# Patient Record
Sex: FEMALE | Race: WHITE | NOT HISPANIC OR LATINO | Employment: FULL TIME | URBAN - METROPOLITAN AREA
[De-identification: names, ages, dates, MRNs, and addresses within clinical notes are randomized per-mention and may not be internally consistent; named-entity substitution may affect disease eponyms.]

---
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
## 2021-05-14 ENCOUNTER — HOSPITAL ENCOUNTER (OUTPATIENT)
Dept: LAB | Facility: MEDICAL CENTER | Age: 21
End: 2021-05-14
Attending: FAMILY MEDICINE
Payer: COMMERCIAL

## 2021-05-14 LAB
BASOPHILS # BLD AUTO: 0.6 % (ref 0–1.8)
BASOPHILS # BLD: 0.04 K/UL (ref 0–0.12)
EOSINOPHIL # BLD AUTO: 0.13 K/UL (ref 0–0.51)
EOSINOPHIL NFR BLD: 2 % (ref 0–6.9)
ERYTHROCYTE [DISTWIDTH] IN BLOOD BY AUTOMATED COUNT: 39.9 FL (ref 35.9–50)
HCT VFR BLD AUTO: 47.8 % (ref 37–47)
HGB BLD-MCNC: 15.5 G/DL (ref 12–16)
IMM GRANULOCYTES # BLD AUTO: 0.01 K/UL (ref 0–0.11)
IMM GRANULOCYTES NFR BLD AUTO: 0.2 % (ref 0–0.9)
LYMPHOCYTES # BLD AUTO: 2 K/UL (ref 1–4.8)
LYMPHOCYTES NFR BLD: 30.6 % (ref 22–41)
MCH RBC QN AUTO: 28.8 PG (ref 27–33)
MCHC RBC AUTO-ENTMCNC: 32.4 G/DL (ref 33.6–35)
MCV RBC AUTO: 88.8 FL (ref 81.4–97.8)
MONOCYTES # BLD AUTO: 0.4 K/UL (ref 0–0.85)
MONOCYTES NFR BLD AUTO: 6.1 % (ref 0–13.4)
NEUTROPHILS # BLD AUTO: 3.95 K/UL (ref 2–7.15)
NEUTROPHILS NFR BLD: 60.5 % (ref 44–72)
NRBC # BLD AUTO: 0 K/UL
NRBC BLD-RTO: 0 /100 WBC
PLATELET # BLD AUTO: 232 K/UL (ref 164–446)
PMV BLD AUTO: 11 FL (ref 9–12.9)
RBC # BLD AUTO: 5.38 M/UL (ref 4.2–5.4)
WBC # BLD AUTO: 6.5 K/UL (ref 4.8–10.8)

## 2021-05-14 PROCEDURE — 84443 ASSAY THYROID STIM HORMONE: CPT

## 2021-05-14 PROCEDURE — 82306 VITAMIN D 25 HYDROXY: CPT

## 2021-05-14 PROCEDURE — 82607 VITAMIN B-12: CPT

## 2021-05-14 PROCEDURE — 80053 COMPREHEN METABOLIC PANEL: CPT

## 2021-05-14 PROCEDURE — 85025 COMPLETE CBC W/AUTO DIFF WBC: CPT

## 2021-05-14 PROCEDURE — 36415 COLL VENOUS BLD VENIPUNCTURE: CPT

## 2021-05-14 PROCEDURE — 83735 ASSAY OF MAGNESIUM: CPT

## 2021-05-14 PROCEDURE — 84439 ASSAY OF FREE THYROXINE: CPT

## 2021-05-15 LAB
ALBUMIN SERPL BCP-MCNC: 4.7 G/DL (ref 3.2–4.9)
ALBUMIN/GLOB SERPL: 1.6 G/DL
ALP SERPL-CCNC: 25 U/L (ref 30–99)
ALT SERPL-CCNC: 14 U/L (ref 2–50)
ANION GAP SERPL CALC-SCNC: 8 MMOL/L (ref 7–16)
AST SERPL-CCNC: 13 U/L (ref 12–45)
BILIRUB SERPL-MCNC: 0.6 MG/DL (ref 0.1–1.5)
BUN SERPL-MCNC: 11 MG/DL (ref 8–22)
CALCIUM SERPL-MCNC: 9.9 MG/DL (ref 8.5–10.5)
CHLORIDE SERPL-SCNC: 109 MMOL/L (ref 96–112)
CO2 SERPL-SCNC: 24 MMOL/L (ref 20–33)
CREAT SERPL-MCNC: 0.73 MG/DL (ref 0.5–1.4)
GLOBULIN SER CALC-MCNC: 3 G/DL (ref 1.9–3.5)
GLUCOSE SERPL-MCNC: 83 MG/DL (ref 65–99)
MAGNESIUM SERPL-MCNC: 1.9 MG/DL (ref 1.5–2.5)
POTASSIUM SERPL-SCNC: 4 MMOL/L (ref 3.6–5.5)
PROT SERPL-MCNC: 7.7 G/DL (ref 6–8.2)
SODIUM SERPL-SCNC: 141 MMOL/L (ref 135–145)
T4 FREE SERPL-MCNC: 1.18 NG/DL (ref 0.93–1.7)
TSH SERPL DL<=0.005 MIU/L-ACNC: 0.83 UIU/ML (ref 0.38–5.33)
VIT B12 SERPL-MCNC: 332 PG/ML (ref 211–911)

## 2021-05-17 LAB — 25(OH)D3 SERPL-MCNC: 26 NG/ML (ref 30–80)

## 2021-10-21 ENCOUNTER — HOSPITAL ENCOUNTER (OUTPATIENT)
Dept: LAB | Facility: MEDICAL CENTER | Age: 21
End: 2021-10-21
Attending: PHYSICIAN ASSISTANT
Payer: COMMERCIAL

## 2021-10-21 PROCEDURE — 87491 CHLMYD TRACH DNA AMP PROBE: CPT

## 2021-10-21 PROCEDURE — 87591 N.GONORRHOEAE DNA AMP PROB: CPT

## 2021-10-21 PROCEDURE — 88175 CYTOPATH C/V AUTO FLUID REDO: CPT

## 2021-10-25 LAB
C TRACH DNA GENITAL QL NAA+PROBE: NEGATIVE
CYTOLOGY REG CYTOL: NORMAL
N GONORRHOEA DNA GENITAL QL NAA+PROBE: NEGATIVE
SPECIMEN SOURCE: NORMAL

## 2021-11-22 ENCOUNTER — HOSPITAL ENCOUNTER (OUTPATIENT)
Dept: LAB | Facility: MEDICAL CENTER | Age: 21
End: 2021-11-22
Attending: PHYSICIAN ASSISTANT
Payer: COMMERCIAL

## 2021-11-22 ENCOUNTER — NON-PROVIDER VISIT (OUTPATIENT)
Dept: OCCUPATIONAL MEDICINE | Facility: CLINIC | Age: 21
End: 2021-11-22

## 2021-11-22 DIAGNOSIS — Z11.1 ENCOUNTER FOR PPD TEST: Primary | ICD-10-CM

## 2021-11-22 LAB — CYTOLOGY REG CYTOL: NORMAL

## 2021-11-22 PROCEDURE — 88175 CYTOPATH C/V AUTO FLUID REDO: CPT

## 2021-11-22 PROCEDURE — 86580 TB INTRADERMAL TEST: CPT | Performed by: NURSE PRACTITIONER

## 2021-11-24 ENCOUNTER — NON-PROVIDER VISIT (OUTPATIENT)
Dept: OCCUPATIONAL MEDICINE | Facility: CLINIC | Age: 21
End: 2021-11-24

## 2021-11-24 LAB — TB WHEAL 3D P 5 TU DIAM: NORMAL MM

## 2021-11-29 ENCOUNTER — NON-PROVIDER VISIT (OUTPATIENT)
Dept: OCCUPATIONAL MEDICINE | Facility: CLINIC | Age: 21
End: 2021-11-29

## 2021-11-29 DIAGNOSIS — Z11.1 ENCOUNTER FOR PPD TEST: ICD-10-CM

## 2021-11-29 PROCEDURE — 86580 TB INTRADERMAL TEST: CPT | Performed by: NURSE PRACTITIONER

## 2021-12-02 ENCOUNTER — NON-PROVIDER VISIT (OUTPATIENT)
Dept: OCCUPATIONAL MEDICINE | Facility: CLINIC | Age: 21
End: 2021-12-02

## 2021-12-02 LAB — TB WHEAL 3D P 5 TU DIAM: NORMAL MM

## 2022-07-29 ENCOUNTER — HOSPITAL ENCOUNTER (EMERGENCY)
Facility: MEDICAL CENTER | Age: 22
End: 2022-07-29
Attending: EMERGENCY MEDICINE
Payer: COMMERCIAL

## 2022-07-29 ENCOUNTER — APPOINTMENT (OUTPATIENT)
Dept: RADIOLOGY | Facility: MEDICAL CENTER | Age: 22
End: 2022-07-29
Attending: EMERGENCY MEDICINE
Payer: COMMERCIAL

## 2022-07-29 VITALS
WEIGHT: 126.32 LBS | HEIGHT: 67 IN | RESPIRATION RATE: 16 BRPM | HEART RATE: 89 BPM | TEMPERATURE: 98.9 F | OXYGEN SATURATION: 100 % | DIASTOLIC BLOOD PRESSURE: 81 MMHG | BODY MASS INDEX: 19.83 KG/M2 | SYSTOLIC BLOOD PRESSURE: 128 MMHG
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
## 2022-07-29 DIAGNOSIS — S61.411A LACERATION OF RIGHT HAND WITHOUT FOREIGN BODY, INITIAL ENCOUNTER: ICD-10-CM

## 2022-07-29 PROCEDURE — A9270 NON-COVERED ITEM OR SERVICE: HCPCS | Performed by: EMERGENCY MEDICINE

## 2022-07-29 PROCEDURE — 90471 IMMUNIZATION ADMIN: CPT

## 2022-07-29 PROCEDURE — 99284 EMERGENCY DEPT VISIT MOD MDM: CPT

## 2022-07-29 PROCEDURE — 73130 X-RAY EXAM OF HAND: CPT | Mod: RT

## 2022-07-29 PROCEDURE — 304999 HCHG REPAIR-SIMPLE/INTERMED LEVEL 1

## 2022-07-29 PROCEDURE — 90715 TDAP VACCINE 7 YRS/> IM: CPT | Performed by: EMERGENCY MEDICINE

## 2022-07-29 PROCEDURE — 304217 HCHG IRRIGATION SYSTEM

## 2022-07-29 PROCEDURE — 700111 HCHG RX REV CODE 636 W/ 250 OVERRIDE (IP): Performed by: EMERGENCY MEDICINE

## 2022-07-29 PROCEDURE — 700101 HCHG RX REV CODE 250: Performed by: EMERGENCY MEDICINE

## 2022-07-29 PROCEDURE — 700102 HCHG RX REV CODE 250 W/ 637 OVERRIDE(OP): Performed by: EMERGENCY MEDICINE

## 2022-07-29 PROCEDURE — 303747 HCHG EXTRA SUTURE

## 2022-07-29 RX ORDER — IBUPROFEN 600 MG/1
600 TABLET ORAL ONCE
Status: COMPLETED | OUTPATIENT
Start: 2022-07-29 | End: 2022-07-29

## 2022-07-29 RX ORDER — AMOXICILLIN AND CLAVULANATE POTASSIUM 875; 125 MG/1; MG/1
1 TABLET, FILM COATED ORAL 2 TIMES DAILY
Qty: 10 TABLET | Refills: 0 | Status: SHIPPED | OUTPATIENT
Start: 2022-07-29 | End: 2022-08-03

## 2022-07-29 RX ORDER — OXYCODONE HYDROCHLORIDE 5 MG/1
5 TABLET ORAL ONCE
Status: COMPLETED | OUTPATIENT
Start: 2022-07-29 | End: 2022-07-29

## 2022-07-29 RX ORDER — ACETAMINOPHEN 500 MG
1000 TABLET ORAL ONCE
Status: COMPLETED | OUTPATIENT
Start: 2022-07-29 | End: 2022-07-29

## 2022-07-29 RX ORDER — ONDANSETRON 4 MG/1
4 TABLET, ORALLY DISINTEGRATING ORAL ONCE
Status: COMPLETED | OUTPATIENT
Start: 2022-07-29 | End: 2022-07-29

## 2022-07-29 RX ORDER — AMOXICILLIN AND CLAVULANATE POTASSIUM 875; 125 MG/1; MG/1
1 TABLET, FILM COATED ORAL ONCE
Status: COMPLETED | OUTPATIENT
Start: 2022-07-29 | End: 2022-07-29

## 2022-07-29 RX ORDER — OXYCODONE HYDROCHLORIDE 5 MG/1
5 TABLET ORAL EVERY 4 HOURS PRN
Qty: 10 TABLET | Refills: 0 | Status: SHIPPED | OUTPATIENT
Start: 2022-07-29 | End: 2022-08-01

## 2022-07-29 RX ADMIN — LIDOCAINE HYDROCHLORIDE 20 ML: 10 INJECTION, SOLUTION INFILTRATION; PERINEURAL at 13:38

## 2022-07-29 RX ADMIN — OXYCODONE HYDROCHLORIDE 5 MG: 5 TABLET ORAL at 12:38

## 2022-07-29 RX ADMIN — ACETAMINOPHEN 1000 MG: 500 TABLET ORAL at 12:38

## 2022-07-29 RX ADMIN — AMOXICILLIN AND CLAVULANATE POTASSIUM 1 TABLET: 875; 125 TABLET, FILM COATED ORAL at 12:38

## 2022-07-29 RX ADMIN — ONDANSETRON 4 MG: 4 TABLET, ORALLY DISINTEGRATING ORAL at 12:38

## 2022-07-29 RX ADMIN — CLOSTRIDIUM TETANI TOXOID ANTIGEN (FORMALDEHYDE INACTIVATED), CORYNEBACTERIUM DIPHTHERIAE TOXOID ANTIGEN (FORMALDEHYDE INACTIVATED), BORDETELLA PERTUSSIS TOXOID ANTIGEN (GLUTARALDEHYDE INACTIVATED), BORDETELLA PERTUSSIS FILAMENTOUS HEMAGGLUTININ ANTIGEN (FORMALDEHYDE INACTIVATED), BORDETELLA PERTUSSIS PERTACTIN ANTIGEN, AND BORDETELLA PERTUSSIS FIMBRIAE 2/3 ANTIGEN 0.5 ML: 5; 2; 2.5; 5; 3; 5 INJECTION, SUSPENSION INTRAMUSCULAR at 12:48

## 2022-07-29 RX ADMIN — IBUPROFEN 600 MG: 600 TABLET, FILM COATED ORAL at 12:38

## 2022-07-29 ASSESSMENT — FIBROSIS 4 INDEX: FIB4 SCORE: 0.33

## 2022-07-29 NOTE — ED PROVIDER NOTES
ED Provider Note    Scribed for Basil Purcell M.D. by Melonie Guzman. 7/29/2022  12:09 PM    Primary care provider: Raven Lazo M.D.  Means of arrival: Walk in  History obtained from: Patient  History limited by: None    CHIEF COMPLAINT  Chief Complaint   Patient presents with   • Dog Bite     Stopped a dog fight, put her hands in a dogs mouth to stop them from fighting 1 hour ago  Both dogs are up to date on vaccines  Right hand worse than left. Right has three puncture wounds, two lacerations pain with ROM with right thumb. Left has one puncture wound to thumb. Bleeding controlled        HPI  Tammy Bell is a 22 y.o. female who presents to the Emergency Department for evaluation of dog bite onset prior to arrival. Patient has a pitbull that was being put away and began acting aggressive. Dog's vaccinations are up to date. Patient tried pulling dog off her right hand and slid her hand from out of the dog's teeth. Patient is right-handed. Patient turned pale after incident. She admits to associated symptoms of left and right hand pain, but denies numbness. No alleviating factors were reported. She is unsure of her last tetanus shot.        REVIEW OF SYSTEMS  Pertinent positives include left and right hand pain. Pertinent negatives include no numbness.      PAST MEDICAL HISTORY       SURGICAL HISTORY  patient denies any surgical history    SOCIAL HISTORY  Social History     Tobacco Use   • Smoking status: Never Smoker   • Smokeless tobacco: Never Used   Substance Use Topics   • Alcohol use: No   • Drug use: No      Social History     Substance and Sexual Activity   Drug Use No       FAMILY HISTORY  No family history on file.    CURRENT MEDICATIONS  Home Medications     Reviewed by ADITYA WiseNLitzy (Registered Nurse) on 07/29/22 at 1152  Med List Status: Not Addressed   Medication Last Dose Status        Patient Jaskaran Taking any Medications                       ALLERGIES  No Known  "Allergies    PHYSICAL EXAM  VITAL SIGNS: /79   Pulse 99   Temp 37.3 °C (99.1 °F) (Temporal)   Resp 18   Ht 1.702 m (5' 7\")   Wt 57.3 kg (126 lb 5.2 oz)   LMP 07/27/2022 (Approximate)   SpO2 99%   BMI 19.79 kg/m²     Constitutional: Well developed, Well nourished, Mild distress, Non-toxic appearance.   HENT: Normocephalic, Atraumatic.  Oropharynx moist.   Eyes: PERRL, EOMI, Conjunctiva normal, No discharge.   CV: Good pulses  Thorax & Lungs: No respiratory distress.   Skin: Warm, No rash. 1 cm puncture wound on left thumb, puncture wound in space between first and second digit of right hand, 2.5 cm laceration to palmar aspect of right middle finger at DIP joint, 1 cm puncture wound on right index finger palmar side of DIP joint  Musculoskeletal: No major deformities noted.   Neurologic: Awake, alert. Moves all extremities spontaneously.  Psychiatric: Affect normal, Mood normal.    LABS  Results for orders placed or performed in visit on 11/29/21   PPD Skin Test   Result Value Ref Range    Ppd Skin Test 0 mm         RADIOLOGY  DX-HAND 3+ RIGHT   Final Result      No evidence of acute fracture or dislocation.              The radiologist's interpretation of all radiological studies have been reviewed by me.    Laceration Repair Procedure Note    Indication: Laceration    Procedure: The patient was placed in the appropriate position and anesthesia around the laceration was obtained with a full digital block of the right index finger and long (middle) finger using 1% Lidocaine without epinephrine. The area was then cleansed with betadine and draped in a sterile fashion. The laceration was closed with 5-0 Ethilon using interrupted sutures. There were no additional lacerations requiring repair. The wound area was then dressed with gauze.      Total repaired wound length: 2 cm.     Other Items: Suture count: 5    The patient tolerated the procedure well.    Complications: None        COURSE & MEDICAL DECISION " MAKING  Pertinent Labs & Imaging studies reviewed. (See chart for details)    12:09 PM - Patient seen and examined at bedside. Patient will be treated with lidocaine 1%, ibuprofen 600 mg PO, acetaminophen 1,000 mg PO, roxicodone 5 mg PO, zofran 4 mg, aumentin  mg PO, and tetanus 0.5 mL. Ordered DX-Hand right to evaluate her symptoms.     1:29 PM Performed laceration repair procedure as noted above.     Decision Making:  Patient with dog bite bilateral hands, some puncture wounds, had to suture 1 laceration on the middle finger.  Patient is aware to watch for signs of infection.  We will put the patient on antibiotics, Tylenol, ibuprofen, return with any other concerns.  10 days for suture removal, updated the patient's tetanus shot.    HTN/IDDM FOLLOW UP:  The patient is referred to a primary physician for blood pressure management, diabetic screening, and for all other preventive health concerns    I reviewed prescription monitoring program for patient's narcotic use before prescribing a scheduled drug.The patient will not drink alcohol nor drive with prescribed medications. The patient will return for new or worsening symptoms and is stable at the time of discharge.    The patient is referred to a primary physician for blood pressure management, diabetic screening, and for all other preventative health concerns.    In prescribing controlled substances to this patient, I certify that I have obtained and reviewed the medical history of Tammy Bell. I have also made a good casandra effort to obtain applicable records from other providers who have treated the patient and records did not demonstrate any increased risk of substance abuse that would prevent me from prescribing controlled substances.     I have conducted a physical exam and documented it. I have reviewed Ms. Bell’s prescription history as maintained by the [place] Prescription Monitoring Program.     I have assessed the patient’s risk for abuse,  dependency, and addiction using the validated Opioid Risk Tool available at https://www.mdcalc.com/xijzoj-yaok-mcli-ort-narcotic-abuse.     Given the above, I believe the benefits of controlled substance therapy outweigh the risks. The reasons for prescribing controlled substances include non-narcotic, oral analgesic alternatives have been inadequate for pain control. Accordingly, I have discussed the risk and benefits, treatment plan, and alternative therapies with the patient.         DISPOSITION:  Patient will be discharged home in stable condition.    FOLLOW UP:  [facility], Emergency Dept  [address]  [place] [id number] [phone]    If symptoms worsen      OUTPATIENT MEDICATIONS:  New Prescriptions    AMOXICILLIN-CLAVULANATE (AUGMENTIN) 875-125 MG TAB    Take 1 Tablet by mouth 2 times a day for 5 days.    OXYCODONE IMMEDIATE-RELEASE (ROXICODONE) 5 MG TAB    Take 1 Tablet by mouth every four hours as needed for Severe Pain for up to 3 days.         FINAL IMPRESSION  1. Dog bite, initial encounter    2. Laceration of right hand without foreign body, initial encounter       Laceration repair procedure performed by ERP.   Melonie DEMARCO (Scribe), am scribing for, and in the presence of, Basil Purcell M.D..    Electronically signed by: Melonie Guzman (Osorio), 7/29/2022    Basil DEMARCO M.D. personally performed the services described in this documentation, as scribed by Melonie Guzman in my presence, and it is both accurate and complete.    The note accurately reflects work and decisions made by me.  Basil Purcell M.D.  7/29/2022  4:12 PM

## 2022-07-29 NOTE — ED TRIAGE NOTES
"Chief Complaint   Patient presents with   • Dog Bite     Stopped a dog fight, put her hands in a dogs mouth to stop them from fighting 1 hour ago  Both dogs are up to date on vaccines  Right hand worse than left. Right has three puncture wounds, two lacerations pain with ROM with right thumb. Left has one puncture wound to thumb. Bleeding controlled      /79   Pulse 99   Temp 37.3 °C (99.1 °F) (Temporal)   Resp 18   Ht 1.702 m (5' 7\")   Wt 57.3 kg (126 lb 5.2 oz)   LMP 07/27/2022 (Approximate)   SpO2 99%   BMI 19.79 kg/m²     "

## 2022-07-29 NOTE — ED NOTES
Patient verbalized understanding to plan of care and discharge information. Patient reports pain 0/10. Patient in stable condition. Patient ambulated out of ED to person vehicle with stable gait with boyfriend.

## 2022-08-09 ENCOUNTER — HOSPITAL ENCOUNTER (EMERGENCY)
Facility: MEDICAL CENTER | Age: 22
End: 2022-08-09
Payer: COMMERCIAL

## 2022-08-09 VITALS
BODY MASS INDEX: 19.93 KG/M2 | TEMPERATURE: 99.1 F | RESPIRATION RATE: 16 BRPM | HEIGHT: 67 IN | OXYGEN SATURATION: 97 % | WEIGHT: 126.98 LBS | HEART RATE: 75 BPM | SYSTOLIC BLOOD PRESSURE: 113 MMHG | DIASTOLIC BLOOD PRESSURE: 71 MMHG

## 2022-08-09 PROCEDURE — 99281 EMR DPT VST MAYX REQ PHY/QHP: CPT

## 2022-08-09 ASSESSMENT — FIBROSIS 4 INDEX: FIB4 SCORE: 0.33

## 2022-08-09 NOTE — ED TRIAGE NOTES
"Chief Complaint   Patient presents with   • Suture Removal     23 yo female here for suture removal from right middle finger    /71   Pulse 75   Temp 37.3 °C (99.1 °F) (Temporal)   Resp 16   Ht 1.702 m (5' 7\")   Wt 57.6 kg (126 lb 15.8 oz)   LMP 07/27/2022 (Approximate)   SpO2 97%   BMI 19.89 kg/m²      Patient is not Covid Vaccinated   "

## 2022-08-09 NOTE — ED NOTES
Attempted to remove sutures and patient unable to tolerate the removal of one stitch.  Jumps when attempting to remove and pulls away.

## 2022-11-23 ENCOUNTER — HOSPITAL ENCOUNTER (OUTPATIENT)
Facility: MEDICAL CENTER | Age: 22
End: 2022-11-23
Payer: COMMERCIAL

## 2022-11-23 ENCOUNTER — HOSPITAL ENCOUNTER (OUTPATIENT)
Dept: LAB | Facility: MEDICAL CENTER | Age: 22
End: 2022-11-23

## 2022-11-23 PROCEDURE — 88175 CYTOPATH C/V AUTO FLUID REDO: CPT

## 2022-11-23 PROCEDURE — 87591 N.GONORRHOEAE DNA AMP PROB: CPT

## 2022-11-23 PROCEDURE — 87491 CHLMYD TRACH DNA AMP PROBE: CPT
